# Patient Record
Sex: MALE | Race: WHITE | NOT HISPANIC OR LATINO | Employment: UNEMPLOYED | URBAN - METROPOLITAN AREA
[De-identification: names, ages, dates, MRNs, and addresses within clinical notes are randomized per-mention and may not be internally consistent; named-entity substitution may affect disease eponyms.]

---

## 2023-12-07 ENCOUNTER — OFFICE VISIT (OUTPATIENT)
Dept: FAMILY MEDICINE | Facility: CLINIC | Age: 43
End: 2023-12-07
Payer: MEDICAID

## 2023-12-07 VITALS
TEMPERATURE: 97.1 F | RESPIRATION RATE: 17 BRPM | HEIGHT: 70 IN | OXYGEN SATURATION: 96 % | DIASTOLIC BLOOD PRESSURE: 86 MMHG | HEART RATE: 93 BPM | WEIGHT: 233.2 LBS | BODY MASS INDEX: 33.39 KG/M2 | SYSTOLIC BLOOD PRESSURE: 126 MMHG

## 2023-12-07 DIAGNOSIS — Z13.220 LIPID SCREENING: ICD-10-CM

## 2023-12-07 DIAGNOSIS — Z13.1 DIABETES MELLITUS SCREENING: ICD-10-CM

## 2023-12-07 DIAGNOSIS — Z00.00 ROUTINE GENERAL MEDICAL EXAMINATION AT A HEALTH CARE FACILITY: ICD-10-CM

## 2023-12-07 DIAGNOSIS — F19.91 HISTORY OF DRUG USE: Primary | ICD-10-CM

## 2023-12-07 DIAGNOSIS — Z11.3 ROUTINE SCREENING FOR STI (SEXUALLY TRANSMITTED INFECTION): ICD-10-CM

## 2023-12-07 DIAGNOSIS — F17.200 SMOKER: ICD-10-CM

## 2023-12-07 DIAGNOSIS — G56.03 BILATERAL CARPAL TUNNEL SYNDROME: ICD-10-CM

## 2023-12-07 LAB
ALBUMIN SERPL BCG-MCNC: 4.4 G/DL (ref 3.5–5.2)
ALP SERPL-CCNC: 49 U/L (ref 40–150)
ALT SERPL W P-5'-P-CCNC: 35 U/L (ref 0–70)
ANION GAP SERPL CALCULATED.3IONS-SCNC: 12 MMOL/L (ref 7–15)
AST SERPL W P-5'-P-CCNC: 22 U/L (ref 0–45)
BILIRUB SERPL-MCNC: 0.3 MG/DL
BUN SERPL-MCNC: 17.5 MG/DL (ref 6–20)
CALCIUM SERPL-MCNC: 9.4 MG/DL (ref 8.6–10)
CHLORIDE SERPL-SCNC: 106 MMOL/L (ref 98–107)
CHOLEST SERPL-MCNC: 186 MG/DL
CREAT SERPL-MCNC: 0.8 MG/DL (ref 0.67–1.17)
DEPRECATED HCO3 PLAS-SCNC: 23 MMOL/L (ref 22–29)
EGFRCR SERPLBLD CKD-EPI 2021: >90 ML/MIN/1.73M2
FASTING STATUS PATIENT QL REPORTED: NO
GLUCOSE SERPL-MCNC: 116 MG/DL (ref 70–99)
HBA1C MFR BLD: 5.3 % (ref 0–5.6)
HDLC SERPL-MCNC: 28 MG/DL
LDLC SERPL CALC-MCNC: ABNORMAL MG/DL
LDLC SERPL DIRECT ASSAY-MCNC: 97 MG/DL
NONHDLC SERPL-MCNC: 158 MG/DL
POTASSIUM SERPL-SCNC: 3.9 MMOL/L (ref 3.4–5.3)
PROT SERPL-MCNC: 7.6 G/DL (ref 6.4–8.3)
SODIUM SERPL-SCNC: 141 MMOL/L (ref 135–145)
T PALLIDUM AB SER QL: NONREACTIVE
TRIGL SERPL-MCNC: 607 MG/DL

## 2023-12-07 PROCEDURE — 86780 TREPONEMA PALLIDUM: CPT | Performed by: NURSE PRACTITIONER

## 2023-12-07 PROCEDURE — 87389 HIV-1 AG W/HIV-1&-2 AB AG IA: CPT | Performed by: NURSE PRACTITIONER

## 2023-12-07 PROCEDURE — 87591 N.GONORRHOEAE DNA AMP PROB: CPT | Performed by: NURSE PRACTITIONER

## 2023-12-07 PROCEDURE — 99386 PREV VISIT NEW AGE 40-64: CPT | Performed by: NURSE PRACTITIONER

## 2023-12-07 PROCEDURE — 87491 CHLMYD TRACH DNA AMP PROBE: CPT | Performed by: NURSE PRACTITIONER

## 2023-12-07 PROCEDURE — 80061 LIPID PANEL: CPT | Performed by: NURSE PRACTITIONER

## 2023-12-07 PROCEDURE — 83036 HEMOGLOBIN GLYCOSYLATED A1C: CPT | Performed by: NURSE PRACTITIONER

## 2023-12-07 PROCEDURE — 83721 ASSAY OF BLOOD LIPOPROTEIN: CPT | Mod: 59 | Performed by: NURSE PRACTITIONER

## 2023-12-07 PROCEDURE — 36415 COLL VENOUS BLD VENIPUNCTURE: CPT | Performed by: NURSE PRACTITIONER

## 2023-12-07 PROCEDURE — 80053 COMPREHEN METABOLIC PANEL: CPT | Performed by: NURSE PRACTITIONER

## 2023-12-07 RX ORDER — GABAPENTIN 300 MG/1
300 CAPSULE ORAL DAILY
COMMUNITY
Start: 2023-11-14

## 2023-12-07 ASSESSMENT — ENCOUNTER SYMPTOMS
PARESTHESIAS: 0
SORE THROAT: 0
EYE PAIN: 0
FREQUENCY: 0
WEAKNESS: 0
NERVOUS/ANXIOUS: 0
HEMATOCHEZIA: 0
HEADACHES: 0
MYALGIAS: 0
SHORTNESS OF BREATH: 0
DIZZINESS: 0
DYSURIA: 0
CONSTIPATION: 0
FEVER: 0
JOINT SWELLING: 0
HEARTBURN: 0
ARTHRALGIAS: 0
HEMATURIA: 0
DIARRHEA: 0
ABDOMINAL PAIN: 0
NAUSEA: 0
COUGH: 0
CHILLS: 0
PALPITATIONS: 0

## 2023-12-07 ASSESSMENT — PAIN SCALES - GENERAL: PAINLEVEL: MODERATE PAIN (5)

## 2023-12-07 NOTE — COMMUNITY RESOURCES LIST (ENGLISH)
12/07/2023   [facility] Outpatient Clinics  N/A  For additional resource needs, please contact your health insurance member services or your primary care team.  Phone: [phone]   Email: N/A   Address: [address] [place] [address]   Hours: N/A        Food and Nutrition       Food pantry  1  Dann Booker [employer] - Emergency Food Shelf Distance: 0.77 miles      [facility]   [address] [place] [address]  Language: English, Irish  Hours: Mon 9:30 AM - 11:30 AM Appt. Only  Fees: Free   Phone: (517) 934-1228 Email: dann@Teknovus.KDS Website: http://www.Teknovus.org/     2  Marylou PANDEY [facility], [facility] Distance: 0.99 miles      Delivery, [facility]   [address] [place] [address]  Language: English, Irish  Hours: Mon 9:00 AM - 6:00 PM Appt. Only, Tue - Fri 9:00 AM - 5:00 PM Appt. Only  Fees: Free   Phone: (371) 924-1111 Email: info@Platypus Platform.KDS Website: http://www.Platypus Platform.org/site     SNAP application assistance  3  [employer] [place] Distance: 1.73 miles      Phone/Virtual   [address] [facility] [address] [place] [address]  Language: English, Hmong, [place] citizen, Beninese, Irish  Hours: Mon - Fri 8:30 AM - 4:30 PM  Fees: Free   Phone: (478) 381-2035 Email: helpline@hungersolutions.org Website: https://www.hungersolutions.org/programs/[place]-food-helpline/     4  [facility] [employer] [facility] ([facility]) [facility] Distance: 0.72 miles      Phone/Virtual   [address] [facility] [address] [place] [address]  Language: English  Hours: Mon - Fri 8:00 AM - 4:30 PM  Fees: Free   Phone: (297) 900-5914 Email: info@caprw.org Website: http://www.caprw.org/     Soup kitchen or free meals  5  [facility] [place] - [facility] - Free Summer Meals Distance: 0.64 miles      In-Person   [address] [facility] [place] [address]  Language: English, Hmong, Irish  Hours: Mon - Fri 12:00 PM - 1:00 PM , Mon - Fri 3:00 PM - 4:00 PM  Fees: Free    Phone: (367) 778-1959 Email: Rockyice@.[facility]. Website: https://www.hospitals.[facility]/departments/davila-recreation/[facility]     6  [employer] [place] Distance: 1.42 miles      Pickup   [address] [place] [address]  Language: English  Hours: Mon 12:00 PM - 2:00 PM , Wed 12:00 PM - 2:00 PM  Fees: Free   Phone: (992) 688-2578 Ext.4 Email: info@EcoTimber.Z-good Website: http://www.EcoTimber.org          Hotlines and Helplines       Hotline - Housing crisis  7  [facility] Distance: 6 miles      Phone/Virtual   [address] [place] [address]  Language: English  Hours: Mon - Sun Open 24 Hours   Phone: (853) 872-4085 Email: communications@[facility].org Website: https://[facility].org/oursaviourshousing/     8  [facility] Distance: 7.6 miles      Phone/Virtual   [address] [place] [address]  Language: English  Hours: Mon - Sun Open 24 Hours   Phone: (297) 590-9080 Email: info@[facility].Z-good Website: http://www.ITADSecurity.org          Housing       Coordinated Entry access point  9  [facility] and Human [facility] - Coordinated Access to Housing and Shelter (CAHS) - Coordinated Access - Coordinated Entry access point Distance: 0.72 miles      In-Person, Phone/Virtual   [address] [place] [address]  Language: English  Hours: Mon - Fri 8:00 AM - 4:30 PM  Fees: Free   Phone: (322) 998-7596 Website: https://www.[facility]./residents/assistance-support/assistance/housing-services-support     10  [facility] Distance: 1.98 miles      In-Person, Phone/Virtual   [address] [place] [address]  Language: English  Hours: Mon - Fri 8:30 AM - 4:30 PM  Fees: Free   Phone: (230) 472-8988 Email: info@mnBigBarn.org Website: https://www.[facility].org/locations/[facility]/     Drop-in center or day shelter  11  [facility] Distance: 3.17 miles      In-Person   [address] [place] [address]  Language: English  Hours: Mon - Fri 9:00 AM - 4:00 PM  Fees: Free   Phone: (752) 978-5209 Email: josh@Glider.Comsenz Website: http://Glider.Comsenz     12  [facility] - [facility] Center Distance: 6.07 miles      In-Person   [address] [place] [address]  Language: English, Vietnamese, Saudi Arabian  Hours: Mon - Sat 7:00 AM - 3:00 PM  Fees: Free, Self Pay   Phone: (624) 639-1400 Email: info@Differential Dynamics Website: https://www.Panther [employer] Group.Comsenz/locations/opportunity-center/     Housing search assistance  13  [employer] - https://VibeSec/ Distance: 6.3 miles      Phone/Virtual   [address] [place] [address]  Language: English  Hours: Mon - Sun Open 24 Hours   Email: info@VideoSurf Website: https://VibeSec     14  [employer] - Online housing search assistance Distance: 7.46 miles      Phone/Virtual   [address] [address]  Language: English, Hmong, Vietnamese, Saudi Arabian  Hours: Mon - Sun Open 24 Hours   Phone: (880) 372-6032 Email: info@Euro Dream Heat.org Website: http://www.housinglink.org/     Shelter for families  15  [facility] Distance: 15.56 miles      In-Person   [address] [place] [address]  Language: English  Hours: Mon - Fri 3:00 PM - 9:00 AM , Sat - Sun Open 24 Hours  Fees: Free   Phone: (405) 715-5951 Ext.1 Website: https://www.saintandrews.org/2020/07/03/emergency-family-shelter/     Shelter for individuals  16  [facility] and Human [facility] - Coordinated Access to Housing and Shelter (CAHS) - Coordinated Access - Emergency housing Distance: 0.72 miles      In-Person, Phone/Virtual   [address] [place] [address]  Language: English  Hours: Mon - Fri 8:00 AM - 4:30 PM  Fees: Free   Phone: (501) 569-5251 Website:  https://www.[facility]./residents/assistance-support/assistance/housing-services-support     17  [facility] [place] - [facility] [place] [facility] - [facility] [place] Shel[facility] Distance: 1.93 miles      In-Person   [address] [place] [address]  Language: English  Hours: Mon - Sun 5:00 PM - 10:00 AM  Fees: Free, Self Pay   Phone: (718) 551-7140 Email: info@[facility] Website: https://www.[facility]/locations/[facility]/          Important Numbers & Websites       [address].org  Poison Control   (469) 364-3879 [place]po[place].org  Suicide and Crisis Lifeline   [address] [place], [place] [address]line.org  Childhelp [place] Child Abuse Hotline   352.711.7280 Childhelphotline.org  [place] Sexual Assault Hotline   (639) 424-2216 (HOPE) [facility] Runaway Safeline   (778) 415-8883 (RUNAWAY) [facility].org  Pregnancy & Postpartum Support [place]   Call/text 461-173-3961 Ppsupportmn.org  Substance Abuse National Helpline ([facility]   087-082-HELP (0830) Findtreatment.gov  Emergency Services   911

## 2023-12-07 NOTE — COMMUNITY RESOURCES LIST (ENGLISH)
12/07/2023   [facility]  N/A  For questions about this resource list or additional care needs, please contact your primary care clinic or care manager.  Phone: [phone]   Email: N/A   Address: [address] [place] [address]   Hours: N/A        Food and Nutrition       Food pantry  1  Dann Balbuena [employer] - Emergency Food Shelf Distance: 0.77 miles      Pickup   [address] [place] [address]  Language: English, Cape Verdean  Hours: Mon 9:30 AM - 11:30 AM Appt. Only  Fees: Free   Phone: (152) 456-1489 Email: dann@[employer]berg [employer] Website: http://www.MIOX.org/     2  Marylou Q. [facility], [facility] Distance: 0.99 miles      Delivery, Pickup   [address] [place] [address]  Language: English, Cape Verdean  Hours: Mon 9:00 AM - 6:00 PM Appt. Only, Tue - Fri 9:00 AM - 5:00 PM Appt. Only  Fees: Free   Phone: (303) 768-4791 Email: info@Ozsale.org Website: http://www.Ozsale.org/site     SNAP application assistance  3  [facility] ([facility] Distance: 0.72 miles      Phone/Virtual   [address] [facility] [address] [place] [address]  Language: English  Hours: Mon - Fri 8:00 AM - 4:30 PM  Fees: Free   Phone: (345) 742-8432 Email: info@caprw.org Website: http://www.caprw.org/     4  [employer] [place] Distance: 1.73 miles      Phone/Virtual   [address] [facility] [address] [place] [address]  Language: English, Hmong, English, Bangladeshi, Cape Verdean  Hours: Mon - Fri 8:30 AM - 4:30 PM  Fees: Free   Phone: (451) 418-5118 Email: helpline@hungersolutions.org Website: https://www.hungersolutions.org/programs/[place]-food-helpline/     Soup kitchen or free meals  5  [facility] [place] [facility] - Free Summer Meals Distance: 0.64 miles      In-Person   [address] [facility] [place] [address]  Language: English, Hmong, Cape Verdean  Hours: Mon - Fri 12:00 PM - 1:00 PM , Mon - Fri 3:00 PM - 4:00 PM  Fees:  Free   Phone: (742) 548-5753 Email: Lizzy@.[facility]. Website: https://www.[facility]/departments/davila-recreation/[facility]     6  [facility] [place] Distance: 1.42 miles      Pickup   [address] [place] [address]  Language: English  Hours: Mon 12:00 PM - 2:00 PM , Wed 12:00 PM - 2:00 PM  Fees: Free   Phone: (385) 465-1099 Ext.4 Email: info@[employer].Stonybrook Purification Website: http://www.[employer] Cod Technologies.org          Hotlines and Helplines       Hotline - Housing crisis  7  [facility] Distance: 6 miles      Phone/Virtual   [address] [place] [address]  Language: English  Hours: Mon - Sun Open 24 Hours   Phone: (542) 670-1950 Email: communications@[facility].org Website: https://[facility].org/oursaviourshousing/     8  [facility] Distance: 7.6 miles      Phone/Virtual   [address] [place] [address]  Language: English  Hours: Mon - Sun Open 24 Hours   Phone: (581) 476-5932 Email: info@[employer].[facility].Stonybrook Purification Website: http://www.Osisis Global Search.org          Housing       Coordinated Entry access point  9  [facility] and Human [facility] - Coordinated Access to Housing and Shelter (CAHS) - Coordinated Access - Coordinated Entry access point Distance: 0.72 miles      In-Person, Phone/Virtual   [address] [place] [address]  Language: English  Hours: Mon - Fri 8:00 AM - 4:30 PM  Fees: Free   Phone: (590) 870-9507 Website: https://www.[facility]./residents/assistance-support/assistance/housing-services-support     10  [facility] Distance: 1.98 miles      In-Person, Phone/Virtual   [address] [place] [address]  Language: English  Hours: Mon - Fri 8:30 AM - 4:30 PM  Fees: Free   Phone: (334) 275-4422 Email: info@mnSynbiota.org Website: https://www.mncare.org/locations/[facility]/     Drop-in center or day shelter  11  [facility] Distance: 3.17 miles      In-Person   [address]  [place] [address]  Language: English  Hours: Mon - Fri 9:00 AM - 4:00 PM  Fees: Free   Phone: (247) 294-9770 Email: josh@TapZilla.Tanner Research Website: http://Aria Analytics     12  [facility] Opportunity [facility] Distance: 6.07 miles      In-Person   [address] [place] [address]  Language: English, Icelandic, Romanian  Hours: Mon - Sat 7:00 AM - 3:00 PM  Fees: Free, Self Pay   Phone: (162) 665-7313 Email: info@Medivance Website: https://www.Rivono.Tanner Research/locations/opportunity-center/     Housing search assistance  13  [facility] [place] - Mental Health Crisis Housing Search Assistance Distance: 2.01 miles      In-Person, Phone/Virtual   [address] [facility] [address] [place] [address]  Language: English  Hours: Mon - Tue 8:00 AM - 4:30 PM , Wed 8:00 AM - 6:00 PM , Thu - Fri 8:00 AM - 4:30 PM  Fees: Free   Phone: (821) 502-8056 Email: [facility] Satellite@[facility]. Website: https://www.[facility]./residents/health-medical/clinics-services/mental-health/adult-mental-health     14  [facility] - Online Housing Search Assistance Distance: 3.17 miles      Phone/Virtual   [address] [place] [address]  Language: English  Hours: Mon - Sun Open 24 Hours  Fees: Free   Phone: (370) 448-8112 Email: lauro@[employer]utility Electric Website: https://CCS Environmental.org/     Shelter for families  15  [facility] Distance: 15.56 miles      In-Person   [address] [place] [address]  Language: English  Hours: Mon - Fri 3:00 PM - 9:00 AM , Sat - Sun Open 24 Hours  Fees: Free   Phone: (545) 854-2108 Ext.1 Website: https://www.saintandrews.org/2020/07/03/emergency-family-shelter/     Shelter for individuals  16  [facility] - Coordinated Access to Housing and Shelter ([facility]) - Coordinated Access - Emergency housing Distance: 0.72 miles      In-Person,  Phone/Virtual   [address] [place] [address]  Language: English  Hours: Mon - Fri 8:00 AM - 4:30 PM  Fees: Free   Phone: (686) 442-5622 Website: https://www.[facility]./residents/assistance-support/assistance/housing-services-support     17  [facility] - [facility] [place] Shel[facility] - [facility] [place] Shelter Distance: 1.93 miles      In-Person   [address] [place] [address]  Language: English  Hours: Mon - Sun 5:00 PM - 10:00 AM  Fees: Free, Self Pay   Phone: (375) 225-5972 Email: info@[employer] Website: https://www.Billabong International.CLEAR/locations/[facility]-saint-paul/          Important Numbers & Websites       Emergency Services   911  [facility] Services   311  Poison Control   (607) 548-9835  Suicide Prevention Lifeline   (182) 355-8396 (TALK)  Child Abuse Hotline   (470) 234-9067 (4-A-Child)  Sexual Assault Hotline   (342) 862-7654 (HOPE)  National Runaway Safeline   (575) 911-4316 (RUNAWAY)  All-Options Talkline   (814) 835-1108  Substance Abuse Referral   (102) 879-6961 (HELP)

## 2023-12-07 NOTE — PROGRESS NOTES
SUBJECTIVE:   Jameson is a 43 year old, with past medical history of carpal tunnel syndrome presenting for the following:  Recheck Medication and Physical        12/7/2023    10:23 AM   Additional Questions   Roomed by roxana smyth         12/7/2023    10:24 AM   Patient Reported Additional Medications   Patient reports taking the following new medications gabapentin 300 mg     1.Carpal tunnel syndrome bilateral- will need surgery in about 1 month. Needs to get EMG first ,already has appointment.     New in [place]- moved from [place] - sober living house. 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 Attending classes M-Th from 9-2pm. Has been cleaned for 2 months. Currently unemployed. Will need to have carpal tunnel surgery first, then he will look for a job. Work hx construction.  Denies drinking alcohol  Smoking 1/2 pack of cigarettes a day.     Healthy Habits:     Getting at least 3 servings of Calcium per day:  Yes    Bi-annual eye exam:  NO    Dental care twice a year:  Yes    Sleep apnea or symptoms of sleep apnea:  None    Diet:  Other    Frequency of exercise:  None    Taking medications regularly:  Yes    Medication side effects:  None    Additional concerns today:  No      Today's PHQ-2 Score:       12/7/2023    10:14 AM   PHQ-2 ( 1999 Pfizer)   Q1: Little interest or pleasure in doing things 0   Q2: Feeling down, depressed or hopeless 0   PHQ-2 Score 0   Q1: Little interest or pleasure in doing things Not at all   Q2: Feeling down, depressed or hopeless Not at all   PHQ-2 Score 0         Social History     Tobacco Use    Smoking status: Every Day     Packs/day: .5     Types: Cigarettes    Smokeless tobacco: Never   Substance Use Topics    Alcohol use: Not on file             12/7/2023    10:14 AM   Alcohol Use   Prescreen: >3 drinks/day or >7 drinks/week? No     Smoking hx: smoking for about 10-15 years, 1/2 pack a day. Trying to cut down, and then will stop all together. Tried patches in the past but not helpful    Last  "PSA: No results found for: \"PSA\"    Reviewed orders with patient. Reviewed health maintenance and updated orders accordingly - Yes  Lab work is in process  Labs reviewed in EPIC    Reviewed and updated as needed this visit by clinical staff   Tobacco  Allergies  Meds  Problems  Med Hx  Surg Hx  Fam Hx          Reviewed and updated as needed this visit by Provider   Tobacco  Allergies  Meds  Problems  Med Hx  Surg Hx  Fam Hx             Review of Systems   Constitutional:  Negative for chills and fever.   HENT:  Negative for congestion, ear pain, hearing loss and sore throat.    Eyes:  Negative for pain and visual disturbance.   Respiratory:  Negative for cough and shortness of breath.    Cardiovascular:  Negative for chest pain, palpitations and peripheral edema.   Gastrointestinal:  Negative for abdominal pain, constipation, diarrhea, heartburn, hematochezia and nausea.   Genitourinary:  Negative for dysuria, frequency, genital sores, hematuria, impotence, penile discharge and urgency.   Musculoskeletal:  Negative for arthralgias, joint swelling and myalgias.   Skin:  Negative for rash.   Neurological:  Negative for dizziness, weakness, headaches and paresthesias.   Psychiatric/Behavioral:  Negative for mood changes. The patient is not nervous/anxious.          OBJECTIVE:   /86   Pulse 93   Temp 97.1  F (36.2  C)   Resp 17   Ht 1.778 m (5' 10\")   Wt 105.8 kg (233 lb 3.2 oz)   SpO2 96%   BMI 33.46 kg/m      Physical Exam  GENERAL: healthy, alert and no distress  EYES: Eyes grossly normal to inspection, PERRL and conjunctivae and sclerae normal  HENT: ear canals and TM's normal, nose and mouth without ulcers or lesions  NECK: no adenopathy, no asymmetry, masses, or scars and thyroid normal to palpation  RESP: lungs clear to auscultation - no rales, rhonchi or wheezes  CV: regular rate and rhythm, normal S1 S2, no S3 or S4, no murmur, click or rub, no peripheral edema and peripheral pulses " strong  ABDOMEN: soft, nontender, no hepatosplenomegaly, no masses and bowel sounds normal  MS: no gross musculoskeletal defects noted, no edema  SKIN: no suspicious lesions or rashes  NEURO: Normal strength and tone, mentation intact and speech normal  PSYCH: mentation appears normal, affect normal/bright    Diagnostic Test Results:  Labs reviewed in Epic    ASSESSMENT/PLAN:   Jameson was seen today for recheck medication and physical.    Diagnoses and all orders for this visit:    Routine general medical examination at a health care facility  Recovering from amphetamine drug use. Has been cleaned for 2 months. Living at sober housing.   He is going to get influenza and covid-19 vaccine at outside location  Lifestyle modifications were advised including diet and exercise guidelines, sunscreen to prevent skin cancer.   -     Comprehensive metabolic panel (BMP + Alb, Alk Phos, ALT, AST, Total. Bili, TP); Future  -     Comprehensive metabolic panel (BMP + Alb, Alk Phos, ALT, AST, Total. Bili, TP)    Bilateral carpal tunnel syndrome  Follow-up with hand surgery as planned    Smoker  Advised on smoking cessation  -     nicotine (NICORETTE) 2 MG gum; Place 1 each (2 mg) inside cheek every hour as needed for smoking cessation    Lipid screening  -     Lipid panel reflex to direct LDL Fasting; Future  -     Lipid panel reflex to direct LDL Fasting    Diabetes mellitus screening  -     Hemoglobin A1c; Future  -     Hemoglobin A1c    Routine screening for STI (sexually transmitted infection)  -     HIV Antigen Antibody Combo Cascade; Future  -     Treponema Abs w Reflex to RPR and Titer; Future  -     Neisseria gonorrhoeae PCR; Future  -     Chlamydia trachomatis PCR; Future  -     HIV Antigen Antibody Combo Cascade  -     Treponema Abs w Reflex to RPR and Titer  -     Neisseria gonorrhoeae PCR  -     Chlamydia trachomatis PCR    Other orders  -     PRIMARY CARE FOLLOW-UP SCHEDULING; Future              COUNSELING:   Reviewed  "preventive health counseling, as reflected in patient instructions      BMI:   Estimated body mass index is 33.46 kg/m  as calculated from the following:    Height as of this encounter: 1.778 m (5' 10\").    Weight as of this encounter: 105.8 kg (233 lb 3.2 oz).   Weight management plan: Discussed healthy diet and exercise guidelines      He reports that he has been smoking cigarettes. He has been smoking an average of .5 packs per day. He has never used smokeless tobacco.  Nicotine/Tobacco Cessation Plan:   Pharmacotherapies : Nicotine gum            WAQAR Grey CNP  [facility]  "

## 2023-12-08 LAB
C TRACH DNA SPEC QL NAA+PROBE: NEGATIVE
HIV 1+2 AB+HIV1 P24 AG SERPL QL IA: NONREACTIVE
N GONORRHOEA DNA SPEC QL NAA+PROBE: NEGATIVE

## 2024-02-01 ENCOUNTER — TELEPHONE (OUTPATIENT)
Dept: FAMILY MEDICINE | Facility: CLINIC | Age: 44
End: 2024-02-01
Payer: MEDICAID

## 2024-02-01 NOTE — TELEPHONE ENCOUNTER
Called patient and recommended repeating cholesterol test fasting and his triglycerides at >600. Patient states that he cannot come for lab test, as he cannot leave the house- he is not allowed to leave his house, not even for medical appointments. I inquired if he is on impatient treatment now, and the call was lost or maybe he hang up.